# Patient Record
Sex: FEMALE | Race: WHITE | Employment: OTHER | URBAN - METROPOLITAN AREA
[De-identification: names, ages, dates, MRNs, and addresses within clinical notes are randomized per-mention and may not be internally consistent; named-entity substitution may affect disease eponyms.]

---

## 2020-02-27 ENCOUNTER — APPOINTMENT (OUTPATIENT)
Dept: GENERAL RADIOLOGY | Age: 85
End: 2020-02-27
Attending: EMERGENCY MEDICINE
Payer: MEDICARE

## 2020-02-27 ENCOUNTER — HOSPITAL ENCOUNTER (EMERGENCY)
Age: 85
Discharge: HOME | End: 2020-02-27
Attending: EMERGENCY MEDICINE
Payer: MEDICARE

## 2020-02-27 VITALS
TEMPERATURE: 95.8 F | SYSTOLIC BLOOD PRESSURE: 176 MMHG | OXYGEN SATURATION: 89 % | WEIGHT: 130 LBS | DIASTOLIC BLOOD PRESSURE: 130 MMHG | HEART RATE: 110 BPM | RESPIRATION RATE: 26 BRPM

## 2020-02-27 DIAGNOSIS — R00.0 TACHYCARDIA: ICD-10-CM

## 2020-02-27 DIAGNOSIS — M25.551 RIGHT HIP PAIN: ICD-10-CM
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
## 2020-02-27 DIAGNOSIS — R26.2 INABILITY TO WALK: ICD-10-CM

## 2020-02-27 DIAGNOSIS — F03.C0 SEVERE DEMENTIA: ICD-10-CM

## 2020-02-27 PROCEDURE — 99284 EMERGENCY DEPT VISIT MOD MDM: CPT

## 2020-02-27 PROCEDURE — 72170 X-RAY EXAM OF PELVIS: CPT

## 2020-02-27 NOTE — ED NOTES
I have reviewed discharge instructions with the patients daughter, LYNETTE. Miguel Camacho Her daughter verbalized understanding and has no other questions.

## 2020-02-27 NOTE — ED TRIAGE NOTES
PT reports via EMS. EMS reports that per the caregiver, pt was found on the floor \"for about 2 hours. \" EMS reports that daughter his her POA and is a nurse and stated that she did not want the patient to be transported, due to being on comfort care and eventually hospice. EMS state that the patient is at baseline and has dementia and is nonverbal. PT is withdrawing for pain on the right side. EMS reports giving 25mcg of Fentanyl intranasally.

## 2020-02-27 NOTE — ED PROVIDER NOTES
EMERGENCY DEPARTMENT HISTORY AND PHYSICAL EXAM    Date: 2/27/2020  Patient Name: Claudette Archibald    History of Presenting Illness     Chief Complaint   Patient presents with   Denise Sandor Fall         History Provided By: EMS    Claudette Arhcibald is a 80 y.o. female who presents to the emergency department C/O unwitnessed fall, possible right hip injury. EMS report states that the patient had an unwitnessed fall from her home hospital bed. States that the patient has history of extensive dementia and does not provide history and is currently at her mental baseline. States that her daughter is the power of  and a nurse herself at [employer] [facility] [employer].  States that the caregiver found her on the floor for about 2 hours. Initially noted that they did not want the patient to be transported due to being currently in comfort care and eventually hospice but eventually the patient was transported. EMS states they gave 25 mcg of fentanyl intranasally for pain. They note the patient was withdrawing from pain on the right side of her hip. Katie Anderson PCP: Other, [facility]        Past History     Past Medical History:  No past medical history on file. Past Surgical History:  No past surgical history on file. Family History:  No family history on file. Social History:  Social History     Tobacco Use    Smoking status: Not on file   Substance Use Topics    Alcohol use: Not on file    Drug use: Not on file       Allergies: Allergies not on file      Review of Systems   Review of Systems   Unable to perform ROS: Dementia   All other systems reviewed and are negative.         Physical Exam     Vitals:    02/27/20 1253 02/27/20 1349   BP: (!) 176/130    Pulse: (!) 110    Resp: 26    Temp: 95.8 °F (35.4 °C)    SpO2: (!) 89%    Weight:  59 kg (130 lb)     Physical Exam    Nursing notes and vital signs reviewed    Airway: intact, speaking normally  Breathing: No apparent distress, no cyanosis  Circulation: Peripheral pulses equal    Constitutional: Chronically ill-appearing, no acute distress, cachectic  HEENT & Neck: Normocephalic, Atraumatic, PERRL, EOMI, No rhinorrhea, external nose normal, external ears normal, mucous membranes moist, No stridor, No JVD  Cardiovascular: Tachycardic, regular rhythm, no murmurs  Chest: Normal work of breathing and chest excursion bilaterally  Lungs: Clear to ausculation bilaterally  Abdomen: Soft, non tender, non distended, normoactive bowel sounds, No rigidity, no peritoneal signs  Musculoskeletal: No evidence of trauma or deformity to the back or neck patient's right leg does appear slightly shortened and externally rotated  Extremities: No evidence of trauma or deformity, no LE edema  Skin: Warm, No obvious rashes  Neuro: Patient is awake but oriented x0, mumbles random speech, no focal deficits noted, CN 2-12 intact        Diagnostic Study Results     Labs -   No results found for this or any previous visit (from the past 72 hour(s)). Radiologic Studies -   XR PELV 1 OR 2 V   Final Result   IMPRESSION:     1. Intact appearing bony pelvis. 2. Mild bilateral hip joint osteoarthrosis. CT Results  (Last 48 hours)    None        CXR Results  (Last 48 hours)    None          Medications given in the ED-  Medications - No data to display      Medical Decision Making   I am the first provider for this patient. I reviewed the vital signs, available nursing notes, past medical history, past surgical history, family history and social history. Vital Signs-Reviewed the patient's vital signs. Records Reviewed: Nursing Notes    Procedures:  Procedures    ED Course:   Pelvis x-ray shows no evidence of acute process. Patient's daughter now present at bedside. States she is the power of  and the patient is comfort care at this time. I did discuss with her her abnormal vital signs. She was agreeable to no further ED testing and discharge back to her home.  Will coordinate EMS transport due to her severe debility and inability to walk    Diagnosis and Disposition       DISCHARGE NOTE:    Edmar Fallon's  results have been reviewed with her. She has been counseled regarding her diagnosis, treatment, and plan. She verbally conveys understanding and agreement of the signs, symptoms, diagnosis, treatment and prognosis and additionally agrees to follow up as discussed. She also agrees with the care-plan and conveys that all of her questions have been answered. I have also provided discharge instructions for her that include: educational information regarding their diagnosis and treatment, and list of reasons why they would want to return to the ED prior to their follow-up appointment, should her condition change. She has been provided with education for proper emergency department utilization. CLINICAL IMPRESSION:    1. Fall from bed, initial encounter    2. Severe dementia ([address]    3. Inability to walk    4. Tachycardia    5. Right hip pain        PLAN:  1. D/C Home  2. There are no discharge medications for this patient. 3.   Follow-up Information     Follow up With Specialties Details Why Contact Info    Your comfort care physician  Call  As needed         _______________________________      Please note that this dictation was completed with GeneCapture, the computer voice recognition software. Quite often unanticipated grammatical, syntax, homophones, and other interpretive errors are inadvertently transcribed by the computer software. Please disregard these errors. Please excuse any errors that have escaped final proofreading.